# Patient Record
Sex: FEMALE | Race: WHITE | NOT HISPANIC OR LATINO | URBAN - METROPOLITAN AREA
[De-identification: names, ages, dates, MRNs, and addresses within clinical notes are randomized per-mention and may not be internally consistent; named-entity substitution may affect disease eponyms.]

---

## 2017-01-02 ENCOUNTER — COMMUNICATION - HEALTHEAST (OUTPATIENT)
Dept: FAMILY MEDICINE | Facility: CLINIC | Age: 42
End: 2017-01-02

## 2017-01-02 DIAGNOSIS — E03.9 HYPOTHYROIDISM: ICD-10-CM

## 2017-01-23 ENCOUNTER — OFFICE VISIT - HEALTHEAST (OUTPATIENT)
Dept: FAMILY MEDICINE | Facility: CLINIC | Age: 42
End: 2017-01-23

## 2017-01-23 DIAGNOSIS — E55.9 VITAMIN D DEFICIENCY: ICD-10-CM

## 2017-01-23 DIAGNOSIS — E03.9 HYPOTHYROIDISM: ICD-10-CM

## 2017-01-23 DIAGNOSIS — E78.00 ELEVATED CHOLESTEROL: ICD-10-CM

## 2017-01-23 DIAGNOSIS — E61.1 IRON DEFICIENCY: ICD-10-CM

## 2017-01-23 DIAGNOSIS — Z00.00 ROUTINE GENERAL MEDICAL EXAMINATION AT A HEALTH CARE FACILITY: ICD-10-CM

## 2017-01-23 LAB
CHOLEST SERPL-MCNC: 283 MG/DL
FASTING STATUS PATIENT QL REPORTED: YES
HDLC SERPL-MCNC: 72 MG/DL
LDLC SERPL CALC-MCNC: 196 MG/DL
TRIGL SERPL-MCNC: 74 MG/DL

## 2017-01-23 RX ORDER — LEVOTHYROXINE SODIUM 150 UG/1
TABLET ORAL
Qty: 30 TABLET | Refills: 11 | Status: SHIPPED | OUTPATIENT
Start: 2017-01-23

## 2017-01-23 RX ORDER — CHLORAL HYDRATE 500 MG
1 CAPSULE ORAL DAILY
Status: SHIPPED | COMMUNITY
Start: 2017-01-23

## 2017-01-23 ASSESSMENT — MIFFLIN-ST. JEOR: SCORE: 1222.41

## 2017-01-26 ENCOUNTER — COMMUNICATION - HEALTHEAST (OUTPATIENT)
Dept: FAMILY MEDICINE | Facility: CLINIC | Age: 42
End: 2017-01-26

## 2017-02-01 ENCOUNTER — COMMUNICATION - HEALTHEAST (OUTPATIENT)
Dept: FAMILY MEDICINE | Facility: CLINIC | Age: 42
End: 2017-02-01

## 2017-02-01 DIAGNOSIS — E03.9 HYPOTHYROIDISM: ICD-10-CM

## 2017-02-10 ENCOUNTER — COMMUNICATION - HEALTHEAST (OUTPATIENT)
Dept: FAMILY MEDICINE | Facility: CLINIC | Age: 42
End: 2017-02-10

## 2017-02-24 ENCOUNTER — OFFICE VISIT - HEALTHEAST (OUTPATIENT)
Dept: FAMILY MEDICINE | Facility: CLINIC | Age: 42
End: 2017-02-24

## 2017-02-24 DIAGNOSIS — M67.432 GANGLION CYST OF WRIST, LEFT: ICD-10-CM

## 2021-05-26 ENCOUNTER — RECORDS - HEALTHEAST (OUTPATIENT)
Dept: ADMINISTRATIVE | Facility: CLINIC | Age: 46
End: 2021-05-26

## 2021-05-28 ENCOUNTER — RECORDS - HEALTHEAST (OUTPATIENT)
Dept: ADMINISTRATIVE | Facility: CLINIC | Age: 46
End: 2021-05-28

## 2021-05-30 VITALS — WEIGHT: 119 LBS | BODY MASS INDEX: 18.68 KG/M2 | HEIGHT: 67 IN

## 2021-05-30 VITALS — BODY MASS INDEX: 18.83 KG/M2 | WEIGHT: 120.2 LBS

## 2021-06-01 ENCOUNTER — RECORDS - HEALTHEAST (OUTPATIENT)
Dept: ADMINISTRATIVE | Facility: CLINIC | Age: 46
End: 2021-06-01

## 2021-06-08 NOTE — PROGRESS NOTES
FEMALE ADULT PREVENTIVE EXAM    CHIEF COMPLAINT:  Female preventive exam.    SUBJECTIVE:  Shaina Coles is a 41 y.o. female who presents for her routine physical exam.    Patient would like to address the following concerns today:         GYNE HISTORY  Menses: monthly  Sexually Active: yes  Contraception: Paraguard IUD  Last Pap: 2014 normal with negative HPV  Abnormal Pap: years ago      She  has a past medical history of Disease of thyroid gland.    Lab Results   Component Value Date    WBC 4.3 09/14/2015    HGB 11.9 (L) 09/14/2015    HCT 35.0 09/14/2015    MCV 92 09/14/2015     09/14/2015     09/14/2015    K 4.2 09/14/2015    BUN 17 09/14/2015     Lab Results   Component Value Date    CHOL 235 (H) 09/14/2015    HDL 58 09/14/2015    LDLCALC 150 (H) 07/29/2014    TRIG 51 07/29/2014     Lab Results   Component Value Date    TSH 1.62 09/14/2015     BP Readings from Last 3 Encounters:   01/23/17 (!) 84/52   02/04/16 102/69   02/02/16 92/54       Surgeries:    Past Surgical History   Procedure Laterality Date     Bunionectomy Right 2/4/2016     Procedure:   RIGHT MARGARITA BUNIONECTOMY;  Surgeon: Chaka Solano DPM;  Location: [facility];  Service:        Family History:  Her family history includes Depression in her mother and sister; Hyperlipidemia in her father; Hypothyroidism in her maternal grandfather and mother.    Social History:  She  reports that she has never smoked. She has never used smokeless tobacco. She reports that she drinks about 0.6 oz of alcohol per week  She reports that she does not use illicit drugs. . 3 children.  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    Medications:    Current Outpatient Prescriptions:      cholecalciferol, vitamin D3, 1,000 unit tablet, Take 1,000 Units by mouth every evening., Disp: , Rfl:      levothyroxine (SYNTHROID, LEVOTHROID) 150 MCG tablet, TAKE ONE TABLET BY MOUTH  ONE TIME DAILY, Disp: 30 tablet, Rfl: 0     multivitamin therapeutic (THERAGRAN) tablet, Take 1 tablet by mouth every evening., Disp: , Rfl:      OMEGA-3/DHA/EPA/FISH OIL (FISH OIL-OMEGA-3 FATTY ACIDS) 300-1,000 mg capsule, Take 1 g by mouth daily., Disp: , Rfl:      UNABLE TO FIND, Take 27 mg by mouth every evening. Med Name:Reacted Iron  (ferrous bisglyconate chelate), Disp: , Rfl:   HELD MEDICATIONS: None.  Supplements: yes    Allergies:  No latex allergies.  Allergies   Allergen Reactions     Codealban Coles            RISK BEHAVIOR & HEALTH HABITS  Regular Exercise: walks dog/ pilates / elliptical.  Balanced diet: Paleo.  Calcium/vitamin D: yes  Stress management: family / friends / ankur  Seat Belt Use: yes    Guns: NO -secured  Dental Care: YES    REVIEW OF SYSTEMS:  Complete head to toe review of systems is otherwise negative except as above.    OBJECTIVE:  VITAL SIGNS:    Vitals:    01/23/17 0921   BP: (!) 84/52   Pulse: 74   Temp: 97.6  F (36.4  C)   SpO2: 99%     GENERAL:  Patient alert, in no acute distress.  EYES: PERRLA. Extraoccular movements intact, pupils equal, reactive to light and accommodation.  Normal conjunctiva and lids.   ENT:  Hearing grossly normal.  Normal appearance to ears and nose.  Bilateral TM s, external canals, oropharynx normal. Normal lips, gums and teeth.   NECK:  Supple, without thyromegaly or mass.  RESP:  Clear to auscultation without crackles, wheezes or distress.  Normal respiratory effort.   CV:  Regular rate and rhythm without murmurs, rubs or gallops.  Normal pedal pulses.  No varicosities or edema.  ABDOMEN:  Soft, non-tender, without hepatosplenomegaly, masses, or hernias.   BREASTS:  Nontender, without masses, nipple discharge, erythema, or axillary adenopathy.  LYMPHATIC: No cervical or axillary lymphadenopathy.  No bruising.  NEURO:  CN II-XII intact, motor & sensory function all intact.  DTR and reflexes normal.  PSYCHIATRIC:  Alert & oriented with normal mood  and affect.  Good judgment and insight.  SKIN:  Normal inspection and palpation.  MUSCULOSKELETAL: Normal gait and station.  - Spine / Ribs / Pelvis: Normal inspection, ROM, stability and strength: Spine, Head, Neck, Upper and Lower Extremities.      Shaina was seen today for annual exam.    Diagnoses and all orders for this visit:    Routine general medical examination at a health care facility  Pap up to date.  Discussed breast cancer screening guidelines.  Will start at age 45.    Hypothyroidism  -     levothyroxine (SYNTHROID, LEVOTHROID) 150 MCG tablet; TAKE ONE TABLET BY MOUTH ONE TIME DAILY  -     T3 (Triiodothyronine), Free  -     T4, Free  -     Thyroid Stimulating Hormone (TSH)    Vitamin D deficiency  -     Vitamin D, Total (25-Hydroxy)    Iron deficiency  -     Ferritin    Elevated cholesterol  -     Lipid Cascade         Nikole Ayoub

## 2021-06-09 NOTE — PROGRESS NOTES
Chief Complaint   Patient presents with     Wrist Pain     Lump n left wrist, in last week, aches all day long.        HPI:    Patient is here for one week of a mildly painful lump at left medial anterior wrist, without injury.     ROS: Pertinent ROS noted in HPI.     Allergies   Allergen Reactions     Codeine      Sanket       Patient Active Problem List   Diagnosis     Dysplastic Nevus     Hypothyroidism     Hyperlipidemia     Bite From A Nonvenomous Arthropod     Ingrowing Toenail     Conjunctivitis     Psoriasis     Backache     Scoliosis       Family History   Problem Relation Age of Onset     Depression Mother      Hypothyroidism Mother      Hyperlipidemia Father      Depression Sister      Hypothyroidism Maternal Grandfather        Social History     Social History     Marital status:      Spouse name: N/A     Number of children: N/A     Years of education: N/A     Occupational History     Not on file.     Social History Main Topics     Smoking status: Never Smoker     Smokeless tobacco: Never Used     Alcohol use 0.6 oz/week     1 Glasses of wine per week      Comment: 1-2 glass weekly     Drug use: No     Sexual activity: Not on file     Other Topics Concern     Not on file     Social History Narrative         Objective:    Vitals:    02/24/17 1719   BP: 94/50   Pulse: 71   Resp: 12   Temp: 97.9  F (36.6  C)   SpO2: 100%       Gen: NAD  Wrist:   Inspection: Normal  Palpation: 0.5 cm hard, smooth mass at left medial anterior wrist with tenderness to palpation, without erythema nor warmth nor fluctuance.   ROM: full bilaterally  Strength: full bilaterally      Impression:     Ganglion cyst of wrist, left    Plan:  F/u with [facility]  Wear your wrist brace.

## 2023-06-14 ENCOUNTER — MYC MEDICAL ADVICE (OUTPATIENT)
Dept: FAMILY MEDICINE | Facility: CLINIC | Age: 48
End: 2023-06-14

## 2023-08-06 ENCOUNTER — HEALTH MAINTENANCE LETTER (OUTPATIENT)
Age: 48
End: 2023-08-06